# Patient Record
Sex: FEMALE | Race: WHITE | Employment: FULL TIME | URBAN - METROPOLITAN AREA
[De-identification: names, ages, dates, MRNs, and addresses within clinical notes are randomized per-mention and may not be internally consistent; named-entity substitution may affect disease eponyms.]

---

## 2017-03-09 ENCOUNTER — TRANSFERRED RECORDS (OUTPATIENT)
Dept: HEALTH INFORMATION MANAGEMENT | Facility: CLINIC | Age: 62
End: 2017-03-09

## 2017-03-16 ENCOUNTER — HOSPITAL ENCOUNTER (OUTPATIENT)
Dept: MRI IMAGING | Facility: CLINIC | Age: 62
End: 2017-03-16
Attending: OPHTHALMOLOGY
Payer: COMMERCIAL

## 2017-03-16 ENCOUNTER — HOSPITAL ENCOUNTER (OUTPATIENT)
Dept: MRI IMAGING | Facility: CLINIC | Age: 62
Discharge: HOME | End: 2017-03-16
Attending: OPHTHALMOLOGY | Admitting: OPHTHALMOLOGY
Payer: COMMERCIAL

## 2017-03-16 DIAGNOSIS — H46.9 OPTIC NEUROPATHY, LEFT: ICD-10-CM

## 2017-03-16 PROCEDURE — 70553 MRI BRAIN STEM W/O & W/DYE: CPT

## 2017-03-16 PROCEDURE — 25500064 ZZH RX 255 OP 636: Performed by: OPHTHALMOLOGY

## 2017-03-16 PROCEDURE — A9585 GADOBUTROL INJECTION: HCPCS | Performed by: OPHTHALMOLOGY

## 2017-03-16 PROCEDURE — 70543 MRI ORBT/FAC/NCK W/O &W/DYE: CPT

## 2017-03-16 RX ORDER — GADOBUTROL 604.72 MG/ML
8 INJECTION INTRAVENOUS ONCE
Status: COMPLETED | OUTPATIENT
Start: 2017-03-16 | End: 2017-03-16

## 2017-03-16 RX ADMIN — GADOBUTROL 8 ML: 604.72 INJECTION INTRAVENOUS at 19:58

## 2017-03-21 ENCOUNTER — DOCUMENTATION ONLY (OUTPATIENT)
Dept: FAMILY MEDICINE | Facility: CLINIC | Age: 62
End: 2017-03-21

## 2017-03-21 ENCOUNTER — TELEPHONE (OUTPATIENT)
Dept: FAMILY MEDICINE | Facility: CLINIC | Age: 62
End: 2017-03-21

## 2017-03-21 DIAGNOSIS — H53.9 VISION CHANGES: ICD-10-CM

## 2017-03-21 DIAGNOSIS — G93.89 BRAIN MASS: Primary | ICD-10-CM

## 2017-03-21 NOTE — PROGRESS NOTES
Called [facility].  Earliest appointment they could work patient in is Monday 3/27/2017 at 9 AM. At [place] office on [address].  Non-detailed message left to return our call to notify patient of referral placed as well as appointment.    Elizabeth Dodson RN   Triage [employer]

## 2017-03-21 NOTE — TELEPHONE ENCOUNTER
Dr. Coffman from [facility] calling for Ai Cottrell CNP. Call given to Dr. Franco as PCP is out of clinic today.   Omayra Harmon RN   [facility] - Triage

## 2017-03-21 NOTE — PROGRESS NOTES
Referral is placed.She has appointment with PCP on 3/22 but if we can coordinate with her and have her get in in next few days with neurosurgery it would be great        Your provider has referred you to: [facility] [phone]   [email or web address]    Please be aware that coverage of these services is subject to the terms and limitations of your health insurance plan.  Call member services at your health plan with any benefit or coverage questions.      Please bring the following with you to your appointment:    (1) Any X-Rays, CTs or MRIs which have been performed.  Contact the facility where they were done to arrange for  prior to your scheduled appointment.   (2) List of current medications  (3) This referral request   (4) Any documents/labs given to you for this referral

## 2017-03-21 NOTE — PROGRESS NOTES
Patient returned call and notified of appointment and agreed with plan.  Elizabeth Dodson RN  Triage [employer]

## 2017-03-21 NOTE — TELEPHONE ENCOUNTER
Spoke with the eye doctor, patient had MRI done for vision issues, and it was noted  she has mass in the brain,she need to be seen by neurosurgeon . Patient was called but not able to reach, will have RN call and have her follow up 3/22 with PCP and discuss further steps which include neurosurgery evaluation.

## 2017-03-21 NOTE — TELEPHONE ENCOUNTER
Dr. Franco spoke with patient and scheduled for tomorrow.  Elizabeth Dodson RN  Triage [employer]                     Next 5 appointments (look out 90 days)     Mar 22, 2017  1:15 PM CDT   Office Visit with SATURNINO Eubanks CNP   [facility] ([facility]    [address] [place] [address]   [phone]            Mar 23, 2017  8:30 AM CDT   MyChart Long with SATURNINO Eubanks CNP   [facility] ([facility])    [address] [place] [id number]   [phone]

## 2017-03-22 ENCOUNTER — OFFICE VISIT (OUTPATIENT)
Dept: FAMILY MEDICINE | Facility: CLINIC | Age: 62
End: 2017-03-22
Payer: COMMERCIAL

## 2017-03-22 VITALS
DIASTOLIC BLOOD PRESSURE: 83 MMHG | HEIGHT: 65 IN | OXYGEN SATURATION: 100 % | SYSTOLIC BLOOD PRESSURE: 132 MMHG | BODY MASS INDEX: 33.66 KG/M2 | TEMPERATURE: 97.7 F | RESPIRATION RATE: 14 BRPM | WEIGHT: 202 LBS

## 2017-03-22 DIAGNOSIS — E78.5 HYPERLIPIDEMIA LDL GOAL <130: ICD-10-CM

## 2017-03-22 DIAGNOSIS — D49.6 BRAIN TUMOR (H): ICD-10-CM

## 2017-03-22 DIAGNOSIS — E66.811 OBESITY (BMI 30.0-34.9): ICD-10-CM

## 2017-03-22 DIAGNOSIS — I10 HYPERTENSION GOAL BP (BLOOD PRESSURE) < 140/90: Primary | ICD-10-CM

## 2017-03-22 DIAGNOSIS — Z11.59 NEED FOR HEPATITIS C SCREENING TEST: ICD-10-CM

## 2017-03-22 DIAGNOSIS — Z12.31 VISIT FOR SCREENING MAMMOGRAM: ICD-10-CM

## 2017-03-22 PROCEDURE — 99213 OFFICE O/P EST LOW 20 MIN: CPT | Performed by: NURSE PRACTITIONER

## 2017-03-22 RX ORDER — SIMVASTATIN 10 MG
TABLET ORAL
Qty: 90 TABLET | Refills: 1 | Status: SHIPPED | OUTPATIENT
Start: 2017-03-22 | End: 2017-09-28

## 2017-03-22 RX ORDER — LISINOPRIL AND HYDROCHLOROTHIAZIDE 12.5; 2 MG/1; MG/1
1 TABLET ORAL DAILY
Qty: 90 TABLET | Refills: 1 | Status: SHIPPED | OUTPATIENT
Start: 2017-03-22 | End: 2017-09-28

## 2017-03-22 NOTE — MR AVS SNAPSHOT
After Visit Summary   3/22/2017    Juany Krueger    MRN: [id number]           Patient Information     Date Of Birth          1955        Visit Information        Provider Department      3/22/2017 1:15 PM Ai Cottrell APRN CNP [facility]        Today's Diagnoses     Hypertension goal BP (blood pressure) < 140/90    -  1    Hyperlipidemia LDL goal <130        Brain tumor (H)        Obesity (BMI 30.0-34.9)        Visit for screening mammogram        Need for hepatitis C screening test           Follow-ups after your visit        Your next 10 appointments already scheduled     Mar 23, 2017  8:45 AM CDT   LAB with [facility]   [facility] ([facility]    [address] [facility] [address] [phone]           OUTSIDE LABS:  Please include name of facility and Physician that is requesting outside labs be drawn.  Please indicate if labs are fasting or non-fasting on appt notes.  Be as specific as you can on which labs are being drawn.            Mar 27, 2017  9:00 AM CDT   New Visit with Holden Dunn MD   [facility] ([facility])    [address] [place] [id number] [phone]              Future tests that were ordered for you today     Open Future Orders        Priority Expected Expires Ordered    MA SCREENING DIGITAL BILAT - Future  (s+30) Routine 3/23/2017 4/28/2017 3/22/2017    Hepatitis C Screen Reflex to HCV RNA Quant and Genotype Routine 3/23/2017 4/28/2017 3/22/2017    Comprehensive metabolic panel Routine 3/23/2017 4/28/2017 3/22/2017    Lipid Profile with reflex to direct LDL Routine 3/23/2017 4/28/2017 3/22/2017            Who to contact     If you have questions or need follow up information about today's clinic visit or your schedule please contact [facility] directly at [phone].  Normal or non-critical lab and  "imaging results will be communicated to you by MyChart, letter or phone within 4 business days after the clinic has received the results. If you do not hear from us within 7 days, please contact the clinic through [employer] or phone. If you have a critical or abnormal lab result, we will notify you by phone as soon as possible.  Submit refill requests through [employer] or call your pharmacy and they will forward the refill request to us. Please allow 3 business days for your refill to be completed.          Additional Information About Your Visit        [employer] Information     [employer] gives you secure access to your electronic health record. If you see a primary care provider, you can also send messages to your care team and make appointments. If you have questions, please call your primary care clinic.  If you do not have a primary care provider, please call [phone] and they will assist you.        Care EveryWhere ID     This is your Care EveryWhere ID. This could be used by other organizations to access your [place] medical records  [id number]        Your Vitals Were     Temperature Respirations Height Last Period Pulse Oximetry BMI (Body Mass Index)    97.7  F (36.5  C) (Tympanic) 14 5' 5\" (1.651 m) 09/17/2006 100% 33.61 kg/m2       Blood Pressure from Last 3 Encounters:   03/22/17 132/83   03/28/16 115/77   05/20/15 139/81    Weight from Last 3 Encounters:   03/22/17 202 lb (91.6 kg)   03/28/16 194 lb (88 kg)   05/20/15 207 lb (93.9 kg)                 Where to get your medicines      These medications were sent to [id number] - [place] - [address]  [address], [place] [address]     Phone:  [phone]     lisinopril-hydrochlorothiazide 20-12.5 MG per tablet    simvastatin 10 MG tablet          Primary Care Provider Office Phone # Fax #    Ai SATURNINO Cottrell [phone]       [address]  [place] [address]      "   Thank you!     Thank you for choosing [facility] [place]  for your care. Our goal is always to provide you with excellent care. Hearing back from our patients is one way we can continue to improve our services. Please take a few minutes to complete the written survey that you may receive in the mail after your visit with us. Thank you!             Your Updated Medication List - Protect others around you: Learn how to safely use, store and throw away your medicines at www.disposemymeds.org.          This list is accurate as of: 3/22/17  1:56 PM.  Always use your most recent med list.                   Brand Name Dispense Instructions for use    aspirin 81 MG tablet     30 tablet    Take 1 tablet (81 mg) by mouth daily       lisinopril-hydrochlorothiazide 20-12.5 MG per tablet    PRINZIDE/ZESTORETIC    90 tablet    Take 1 tablet by mouth daily       simvastatin 10 MG tablet    ZOCOR    90 tablet    Take 1 tablet by mouth. at bedtime.

## 2017-03-22 NOTE — PROGRESS NOTES
SUBJECTIVE:                                                    Juany Krueger is a 61 year old female who presents to clinic today for the following health issues:      Results         Description (location/character/radiation): Pt is here to go over her MRI results.             Problem list and histories reviewed & adjusted, as indicated.    Additional history:     Having a fuzziness to her left eye vision. Wears glasses. [facility].   An MRI showed:   Large irregularly shaped extra-axial mass involving the left side  of the suprasellar cistern, left middle cranial fossa and left  anterior cranial fossa consistent with a large parasellar meningioma.  This mass displaces the left side of the optic chiasm and likely  alters the course of the left optic nerve. The mass abuts and likely  mildly displaces the left carotid terminus.      Has been under a lot of stress.   quickly of acute myeloid leukemia at age 60.  He was self employed. She is talking with a  about life insurance. Has to sell her house.  Daughter got  in 2017.     Works as a  at [employer]. A pediatrician at her clinic reviewed the MRI report with her and advised she see neurosurgeon Dr Puri. Hopes to have appt early next week. He is on vacation this week.  No other symptoms except the vision change.     Due to have fasting blood work for hypertension and hyperlipidemia.  Needs meds refilled.     Patient Active Problem List   Diagnosis     HYPERLIPIDEMIA LDL GOAL <130     Hypertension goal BP (blood pressure) < 140/90     Obesity (BMI 30.0-34.9)     Past Surgical History:   Procedure Laterality Date     ECHO COMPLETE      normal, has flow murmur     HC REDUCTION OF LARGE BREAST       PELVIS LAPAROSCOPY,DX         Social History   Substance Use Topics     Smoking status: Former Smoker     Packs/day: 0.25     Years: 3.00     Types: Cigarettes     Quit date: 1975     Smokeless  "tobacco: Never Used     Alcohol use 0.6 - 1.2 oz/week     1 - 2 Standard drinks or equivalent per week      Comment: socially     Family History   Problem Relation Age of Onset     Hypertension Mother      CANCER Mother      brain CA     Hypertension Sister      Hypertension Brother      Lipids Sister          Current Outpatient Prescriptions   Medication Sig Dispense Refill     lisinopril-hydrochlorothiazide (PRINZIDE/ZESTORETIC) 20-12.5 MG per tablet Take 1 tablet by mouth daily 90 tablet 1     simvastatin (ZOCOR) 10 MG tablet Take 1 tablet by mouth. at bedtime. 90 tablet 1     aspirin 81 MG tablet Take 1 tablet (81 mg) by mouth daily 30 tablet 0     [DISCONTINUED] lisinopril-hydrochlorothiazide (PRINZIDE,ZESTORETIC) 20-12.5 MG per tablet Take 1 tablet by mouth daily 90 tablet 1     [DISCONTINUED] simvastatin (ZOCOR) 10 MG tablet Take 1 tablet by mouth. at bedtime. 90 tablet 3       Reviewed and updated as needed this visit by clinical staff       Reviewed and updated as needed this visit by Provider         ROS:  Constitutional, HEENT, cardiovascular, pulmonary, gi and gu systems are negative, except as otherwise noted.    OBJECTIVE:                                                    /83 (Cuff Size: Adult Large)  Temp 97.7  F (36.5  C) (Tympanic)  Resp 14  Ht 5' 5\" (1.651 m)  Wt 202 lb (91.6 kg)  LMP 09/17/2006  SpO2 100%  BMI 33.61 kg/m2  Body mass index is 33.61 kg/(m^2).  GENERAL APPEARANCE: healthy, alert, teary eyed discussing loss of her   RESP: lungs clear to auscultation - no rales, rhonchi or wheezes  CV: regular rates and rhythm, normal S1 S2, no S3 or S4 and no murmur, click or rub  ABDOMEN: soft, nontender, no HSM          ASSESSMENT/PLAN:                                                        ICD-10-CM    1. Hypertension goal BP (blood pressure) < 140/90 I10 Comprehensive metabolic panel     lisinopril-hydrochlorothiazide (PRINZIDE/ZESTORETIC) 20-12.5 MG per tablet   2. " Hyperlipidemia LDL goal <130 E78.5 Lipid Profile with reflex to direct LDL     simvastatin (ZOCOR) 10 MG tablet   3. Brain tumor (H) D49.6    4. Obesity (BMI 30.0-34.9) E66.9    5. Visit for screening mammogram Z12.31 MA SCREENING DIGITAL BILAT - Future  (s+30)   6. Need for hepatitis C screening test Z11.59 Hepatitis C Screen Reflex to HCV RNA Quant and Genotype       Discussed condition and symptomatic cares  Advise to call this clinic if can't get a timely appt with Dr Puri.  She has appt with Dr Dunn 3/27/2017 but plans to cancel   Reviewed her health  Follow up as needed      SATURNINO Eubanks [facility] [place] [facility]

## 2017-03-22 NOTE — NURSING NOTE
"Chief Complaint   Patient presents with     Results     MRI       Initial /83 (Cuff Size: Adult Large)  Temp 97.7  F (36.5  C) (Tympanic)  Resp 14  Ht 5' 5\" (1.651 m)  Wt 202 lb (91.6 kg)  LMP 09/17/2006  SpO2 100%  BMI 33.61 kg/m2 Estimated body mass index is 33.61 kg/(m^2) as calculated from the following:    Height as of this encounter: 5' 5\" (1.651 m).    Weight as of this encounter: 202 lb (91.6 kg).  Medication Reconciliation: complete   Tabatha Perdomo, CMA    "

## 2017-03-23 DIAGNOSIS — I10 HYPERTENSION GOAL BP (BLOOD PRESSURE) < 140/90: ICD-10-CM

## 2017-03-23 DIAGNOSIS — Z11.59 NEED FOR HEPATITIS C SCREENING TEST: ICD-10-CM

## 2017-03-23 DIAGNOSIS — E78.5 HYPERLIPIDEMIA LDL GOAL <130: ICD-10-CM

## 2017-03-23 LAB
ALBUMIN SERPL-MCNC: 3.9 G/DL (ref 3.4–5)
ALP SERPL-CCNC: 79 U/L (ref 40–150)
ALT SERPL W P-5'-P-CCNC: 31 U/L (ref 0–50)
ANION GAP SERPL CALCULATED.3IONS-SCNC: 5 MMOL/L (ref 3–14)
AST SERPL W P-5'-P-CCNC: 13 U/L (ref 0–45)
BILIRUB SERPL-MCNC: 0.5 MG/DL (ref 0.2–1.3)
BUN SERPL-MCNC: 15 MG/DL (ref 7–30)
CALCIUM SERPL-MCNC: 9.5 MG/DL (ref 8.5–10.1)
CHLORIDE SERPL-SCNC: 108 MMOL/L (ref 94–109)
CHOLEST SERPL-MCNC: 199 MG/DL
CO2 SERPL-SCNC: 31 MMOL/L (ref 20–32)
CREAT SERPL-MCNC: 0.86 MG/DL (ref 0.52–1.04)
GFR SERPL CREATININE-BSD FRML MDRD: 68 ML/MIN/1.7M2
GLUCOSE SERPL-MCNC: 110 MG/DL (ref 70–99)
HCV AB SERPL QL IA: NORMAL
HDLC SERPL-MCNC: 61 MG/DL
LDLC SERPL CALC-MCNC: 120 MG/DL
NONHDLC SERPL-MCNC: 138 MG/DL
POTASSIUM SERPL-SCNC: 4 MMOL/L (ref 3.4–5.3)
PROT SERPL-MCNC: 7.4 G/DL (ref 6.8–8.8)
SODIUM SERPL-SCNC: 144 MMOL/L (ref 133–144)
TRIGL SERPL-MCNC: 90 MG/DL

## 2017-03-23 PROCEDURE — 80061 LIPID PANEL: CPT | Performed by: NURSE PRACTITIONER

## 2017-03-23 PROCEDURE — 80053 COMPREHEN METABOLIC PANEL: CPT | Performed by: NURSE PRACTITIONER

## 2017-03-23 PROCEDURE — 36415 COLL VENOUS BLD VENIPUNCTURE: CPT | Performed by: NURSE PRACTITIONER

## 2017-03-23 PROCEDURE — 86803 HEPATITIS C AB TEST: CPT | Performed by: NURSE PRACTITIONER

## 2017-05-04 ENCOUNTER — OFFICE VISIT (OUTPATIENT)
Dept: FAMILY MEDICINE | Facility: CLINIC | Age: 62
End: 2017-05-04
Payer: COMMERCIAL

## 2017-05-04 VITALS
HEIGHT: 66 IN | OXYGEN SATURATION: 97 % | SYSTOLIC BLOOD PRESSURE: 119 MMHG | DIASTOLIC BLOOD PRESSURE: 82 MMHG | WEIGHT: 201 LBS | TEMPERATURE: 98 F | HEART RATE: 85 BPM | BODY MASS INDEX: 32.3 KG/M2

## 2017-05-04 DIAGNOSIS — E78.5 HYPERLIPIDEMIA LDL GOAL <130: ICD-10-CM

## 2017-05-04 DIAGNOSIS — E66.811 OBESITY (BMI 30.0-34.9): ICD-10-CM

## 2017-05-04 DIAGNOSIS — R73.09 ELEVATED GLUCOSE: ICD-10-CM

## 2017-05-04 DIAGNOSIS — I10 HYPERTENSION GOAL BP (BLOOD PRESSURE) < 140/90: ICD-10-CM

## 2017-05-04 DIAGNOSIS — D49.6 BRAIN TUMOR (H): ICD-10-CM

## 2017-05-04 DIAGNOSIS — Z01.818 PRE-OP EXAM: Primary | ICD-10-CM

## 2017-05-04 LAB
ANION GAP SERPL CALCULATED.3IONS-SCNC: 7 MMOL/L (ref 3–14)
BUN SERPL-MCNC: 16 MG/DL (ref 7–30)
CALCIUM SERPL-MCNC: 9.6 MG/DL (ref 8.5–10.1)
CHLORIDE SERPL-SCNC: 105 MMOL/L (ref 94–109)
CO2 SERPL-SCNC: 29 MMOL/L (ref 20–32)
CREAT SERPL-MCNC: 0.87 MG/DL (ref 0.52–1.04)
GFR SERPL CREATININE-BSD FRML MDRD: 66 ML/MIN/1.7M2
GLUCOSE SERPL-MCNC: 108 MG/DL (ref 70–99)
HBA1C MFR BLD: 5.7 % (ref 4.3–6)
HGB BLD-MCNC: 14.5 G/DL (ref 11.7–15.7)
POTASSIUM SERPL-SCNC: 4 MMOL/L (ref 3.4–5.3)
SODIUM SERPL-SCNC: 141 MMOL/L (ref 133–144)

## 2017-05-04 PROCEDURE — 80048 BASIC METABOLIC PNL TOTAL CA: CPT | Performed by: NURSE PRACTITIONER

## 2017-05-04 PROCEDURE — 99214 OFFICE O/P EST MOD 30 MIN: CPT | Mod: 25 | Performed by: NURSE PRACTITIONER

## 2017-05-04 PROCEDURE — 36415 COLL VENOUS BLD VENIPUNCTURE: CPT | Performed by: NURSE PRACTITIONER

## 2017-05-04 PROCEDURE — 83036 HEMOGLOBIN GLYCOSYLATED A1C: CPT | Performed by: NURSE PRACTITIONER

## 2017-05-04 PROCEDURE — 93000 ELECTROCARDIOGRAM COMPLETE: CPT | Performed by: NURSE PRACTITIONER

## 2017-05-04 PROCEDURE — 85018 HEMOGLOBIN: CPT | Performed by: NURSE PRACTITIONER

## 2017-05-04 NOTE — PROGRESS NOTES
[facility]  [address] [address]  [phone]  Dept: [phone]    PRE-OP EVALUATION:  Today's date: 2017    Juany Krueger (: 1955) presents for pre-operative evaluation assessment as requested by Dr. Karen Puri.  She requires evaluation and anesthesia risk assessment prior to undergoing surgery/procedure for treatment of  Proposed procedure: craniotomy brain surgery     Date of Surgery/ Procedure: 5/10/2017  Time of Surgery/ Procedure: 7:45 am   Hospital/Surgical Facility: [facility]  Fax number for surgical facility: [phone]   Primary Physician: Ai Cottrell NP/Twin Kaminski MD  Type of Anesthesia Anticipated: General    Patient has a Health Care Directive or Living Will:  NO    1. NO - Do you have a history of heart attack, stroke, stent, bypass or surgery on an artery in the head, neck, heart or legs?  2. NO - Do you ever have any pain or discomfort in your chest?  3. NO - Do you have a history of  Heart Failure?  4. NO - Are you troubled by shortness of breath when: walking on the level, up a slight hill or at night?  5. NO - Do you currently have a cold, bronchitis or other respiratory infection?  6. NO - Do you have a cough, shortness of breath or wheezing?  7. NO - Do you sometimes get pains in the calves of your legs when you walk?  8. NO - Do you or anyone in your family have previous history of blood clots?  9. NO - Do you or does anyone in your family have a serious bleeding problem such as prolonged bleeding following surgeries or cuts?  10. NO - Have you ever had problems with anemia or been told to take iron pills?  11. NO - Have you had any abnormal blood loss such as black, tarry or bloody stools, or abnormal vaginal bleeding?  12. NO - Have you ever had a blood transfusion?  13. YES - HAVE YOU OR ANY OF YOUR RELATIVES EVER HAD PROBLEMS WITH ANESTHESIA? Mother- when had brain cancer surgery  14. YES - DO YOU  HAVE SLEEP APNEA, EXCESSIVE SNORING OR DAYTIME DROWSINESS?  Snoring   15. NO - Do you have any prosthetic heart valves?  16. NO - Do you have prosthetic joints?  17. NO - Is there any chance that you may be pregnant?      HPI:                                                      Brief HPI related to upcoming procedure:  Blurry vision in left eye. MRI revealed a brain tumor      See problem list for active medical problems.  Problems all longstanding and stable, except as noted/documented.  See ROS for pertinent symptoms related to these conditions.                                                                                                  .    MEDICAL HISTORY:                                                      Patient Active Problem List    Diagnosis Date Noted     Obesity (BMI 30.0-34.9) 03/28/2016     Priority: Medium     Hypertension goal BP (blood pressure) < 140/90 11/10/2011     HYPERLIPIDEMIA LDL GOAL <130 10/31/2010      Past Medical History:   Diagnosis Date     Essential hypertension      Flow murmur      Hyperlipidemia      Past Surgical History:   Procedure Laterality Date     ECHO COMPLETE  2005    normal, has flow murmur     HC REDUCTION OF LARGE BREAST       PELVIS LAPAROSCOPY,DX       Current Outpatient Prescriptions   Medication Sig Dispense Refill     lisinopril-hydrochlorothiazide (PRINZIDE/ZESTORETIC) 20-12.5 MG per tablet Take 1 tablet by mouth daily 90 tablet 1     simvastatin (ZOCOR) 10 MG tablet Take 1 tablet by mouth. at bedtime. 90 tablet 1     OTC products: no recent use of OTC ASA, NSAIDS or Steroids    No Known Allergies   Latex Allergy: NO    Social History   Substance Use Topics     Smoking status: Former Smoker     Packs/day: 0.25     Years: 3.00     Types: Cigarettes     Quit date: 5/16/1975     Smokeless tobacco: Never Used     Alcohol use 0.6 - 1.2 oz/week     1 - 2 Standard drinks or equivalent per week      Comment: socially     History   Drug Use No       REVIEW OF  "SYSTEMS:                                                    C: NEGATIVE for fever, chills, change in weight  I: NEGATIVE for worrisome rashes, moles or lesions  E: POSITIVE for left vision changes as noted. Wears glasses  E/M: NEGATIVE for ear, mouth and throat problems  R: NEGATIVE for significant cough or SOB  CV: NEGATIVE for chest pain, palpitations or peripheral edema  GI: NEGATIVE for nausea, abdominal pain, heartburn, or change in bowel habits  : NEGATIVE for frequency, dysuria, or hematuria  M: NEGATIVE for significant arthralgias or myalgia  N: NEGATIVE for weakness, dizziness or paresthesias. No headaches.   E: NEGATIVE for temperature intolerance, skin/hair changes  H: NEGATIVE for bleeding problems  P: NEGATIVE for changes in mood or affect    EXAM:                                                    /82 (BP Location: Left arm, Cuff Size: Adult Large)  Pulse 85  Temp 98  F (36.7  C) (Oral)  Ht 5' 5.5\" (1.664 m)  Wt 201 lb (91.2 kg)  LMP 09/17/2006  SpO2 97%  BMI 32.94 kg/m2    GENERAL APPEARANCE: healthy, alert and no distress     EYES: EOMI, PERRL     HENT: ear canals and TM's normal and nose and mouth without ulcers or lesions     NECK: no adenopathy, no asymmetry, masses, or scars and thyroid normal to palpation     RESP: lungs clear to auscultation - no rales, rhonchi or wheezes     CV: regular rates and rhythm, normal S1/S2, no S3 or S4. Grade 2/6 systolic murmur (stable).      ABDOMEN:  soft, nontender, no HSM or masses and bowel sounds normal     MS: extremities normal- no gross deformities noted, no evidence of inflammation in joints, FROM in all extremities.     SKIN: no suspicious lesions or rashes     NEURO: Normal strength and tone, sensory exam grossly normal, mentation intact and speech normal     PSYCH: mentation appears normal. and affect normal/bright     LYMPHATICS: No cervical, or supraclavicular nodes    DIAGNOSTICS:                                                  "     Results for orders placed or performed in visit on 05/04/17   Hemoglobin   Result Value Ref Range    Hemoglobin 14.5 11.7 - 15.7 g/dL   Basic metabolic panel  (Ca, Cl, CO2, Creat, Gluc, K, Na, BUN)   Result Value Ref Range    Sodium 141 133 - 144 mmol/L    Potassium 4.0 3.4 - 5.3 mmol/L    Chloride 105 94 - 109 mmol/L    Carbon Dioxide 29 20 - 32 mmol/L    Anion Gap 7 3 - 14 mmol/L    Glucose 108 (H) 70 - 99 mg/dL    Urea Nitrogen 16 7 - 30 mg/dL    Creatinine 0.87 0.52 - 1.04 mg/dL    GFR Estimate 66 >60 mL/min/1.7m2    GFR Estimate If Black 80 >60 mL/min/1.7m2    Calcium 9.6 8.5 - 10.1 mg/dL   Hemoglobin A1c   Result Value Ref Range    Hemoglobin A1C 5.7 4.3 - 6.0 %     EKG: normal sinus rhythm  ECHO: 2005 done for new heart murmur evaluation- normal     Recent Labs   Lab Test  03/23/17   0908  03/29/16   0813   04/30/14   1042   HGB   --    --    --   14.0   PLT   --    --    --   303   NA  144  144   < >  145*   POTASSIUM  4.0  4.4   < >  4.4   CR  0.86  0.80   < >  0.90   A1C   --    --    --   5.6    < > = values in this interval not displayed.        IMPRESSION:                                                    Reason for surgery/procedure:  Brain tumor    Diagnosis/reason for consult:   Hypertension  Elevated blood glucose  Hyperlipidemia        The proposed surgical procedure is considered HIGH risk.    REVISED CARDIAC RISK INDEX  The patient has the following serious cardiovascular risks for perioperative complications such as (MI, PE, VFib and 3  AV Block):  No serious cardiac risks  INTERPRETATION: 2 risks: Class III (moderate risk - 6.6% complication rate)  Hypertension  Hyperlipidemia    The patient has the following additional risks for perioperative complications:   Obesity BMI 32.94         RECOMMENDATIONS:                                                      --Consult hospital rounder / IM to assist post-op medical management        APPROVAL GIVEN to proceed with proposed procedure, without  further diagnostic evaluation       Signed Electronically by: SATURNINO Eubanks CNP    I have reviewed the documented encounter in it's entireity and concur with Ms. Cottrell's assessment and plan.   Twin Kaminski MD      Copy of this evaluation report is provided to requesting physician.    [facility] Preop Guidelines

## 2017-05-04 NOTE — MR AVS SNAPSHOT
After Visit Summary   5/4/2017    Juany Krueger    MRN: [id number]           Patient Information     Date Of Birth          1955        Visit Information        Provider Department      5/4/2017 9:00 AM Ai Cottrell APRN [facility]        Today's Diagnoses     Pre-op exam    -  1    Hypertension goal BP (blood pressure) < 140/90        Obesity (BMI 30.0-34.9)        Hyperlipidemia LDL goal <130        Elevated glucose          Care Instructions      Before Your Surgery      Call your surgeon if there is any change in your health. This includes signs of a cold or flu (such as a sore throat, runny nose, cough, rash or fever).    Do not smoke, drink alcohol or take over the counter medicine (unless your surgeon or primary care doctor tells you to) for the 24 hours before and after surgery.    If you take prescribed drugs: Follow your doctor s orders about which medicines to take and which to stop until after surgery.    Eating and drinking prior to surgery: follow the instructions from your surgeon    Take a shower or bath the night before surgery. Use the soap your surgeon gave you to gently clean your skin. If you do not have soap from your surgeon, use your regular soap. Do not shave or scrub the surgery site.  Wear clean pajamas and have clean sheets on your bed.         Follow-ups after your visit        Who to contact     If you have questions or need follow up information about today's clinic visit or your schedule please contact [facility] [place] directly at [phone].  Normal or non-critical lab and imaging results will be communicated to you by MyChart, letter or phone within 4 business days after the clinic has received the results. If you do not hear from us within 7 days, please contact the clinic through MyChart or phone. If you have a critical or abnormal lab result, we will notify you by phone as soon as possible.  Submit refill requests  "through [employer] or call your pharmacy and they will forward the refill request to us. Please allow 3 business days for your refill to be completed.          Additional Information About Your Visit        Noribachihart Information     [employer] gives you secure access to your electronic health record. If you see a primary care provider, you can also send messages to your care team and make appointments. If you have questions, please call your primary care clinic.  If you do not have a primary care provider, please call [phone] and they will assist you.        Care EveryWhere ID     This is your Care EveryWhere ID. This could be used by other organizations to access your [place] medical records  [id number]        Your Vitals Were     Pulse Temperature Height Last Period Pulse Oximetry BMI (Body Mass Index)    85 98  F (36.7  C) (Oral) 5' 5.5\" (1.664 m) 09/17/2006 97% 32.94 kg/m2       Blood Pressure from Last 3 Encounters:   05/04/17 119/82   03/22/17 132/83   03/28/16 115/77    Weight from Last 3 Encounters:   05/04/17 201 lb (91.2 kg)   03/22/17 202 lb (91.6 kg)   03/28/16 194 lb (88 kg)              We Performed the Following     Basic metabolic panel  (Ca, Cl, CO2, Creat, Gluc, K, Na, BUN)     EKG 12-lead complete w/read - Clinics     Hemoglobin A1c     Hemoglobin        Primary Care Provider Office Phone # Fax #    SATURNINO Eubanks [phone]       [address]  [place] [address]        Thank you!     Thank you for [facility]  for your care. Our goal is always to provide you with excellent care. Hearing back from our patients is one way we can continue to improve our services. Please take a few minutes to complete the written survey that you may receive in the mail after your visit with us. Thank you!             Your Updated Medication List - Protect others around you: Learn how to safely use, store and throw away your medicines at " www.disposemymeds.org.          This list is accurate as of: 5/4/17  1:29 PM.  Always use your most recent med list.                   Brand Name Dispense Instructions for use    aspirin 81 MG tablet     30 tablet    Take 1 tablet (81 mg) by mouth daily       lisinopril-hydrochlorothiazide 20-12.5 MG per tablet    PRINZIDE/ZESTORETIC    90 tablet    Take 1 tablet by mouth daily       simvastatin 10 MG tablet    ZOCOR    90 tablet    Take 1 tablet by mouth. at bedtime.

## 2017-05-10 ENCOUNTER — TRANSFERRED RECORDS (OUTPATIENT)
Dept: HEALTH INFORMATION MANAGEMENT | Facility: CLINIC | Age: 62
End: 2017-05-10

## 2017-05-16 ENCOUNTER — TELEPHONE (OUTPATIENT)
Dept: FAMILY MEDICINE | Facility: CLINIC | Age: 62
End: 2017-05-16

## 2017-05-16 NOTE — TELEPHONE ENCOUNTER
5/16/2017    Call Regarding Preventive Health Screening Mammogram    Attempt 1    Message No voicemail left - VIP      Comments: VIP Mammogram Patient - schedule in VIP slots only        Outreach   TERRIE

## 2017-05-25 PROBLEM — D42.0: Status: ACTIVE | Noted: 2017-05-25

## 2017-06-07 ENCOUNTER — TRANSFERRED RECORDS (OUTPATIENT)
Dept: HEALTH INFORMATION MANAGEMENT | Facility: CLINIC | Age: 62
End: 2017-06-07

## 2017-06-15 ENCOUNTER — TRANSFERRED RECORDS (OUTPATIENT)
Dept: HEALTH INFORMATION MANAGEMENT | Facility: CLINIC | Age: 62
End: 2017-06-15

## 2017-09-28 ENCOUNTER — OFFICE VISIT (OUTPATIENT)
Dept: FAMILY MEDICINE | Facility: CLINIC | Age: 62
End: 2017-09-28
Payer: COMMERCIAL

## 2017-09-28 VITALS
TEMPERATURE: 98.2 F | OXYGEN SATURATION: 98 % | DIASTOLIC BLOOD PRESSURE: 74 MMHG | RESPIRATION RATE: 16 BRPM | HEART RATE: 80 BPM | BODY MASS INDEX: 30.86 KG/M2 | HEIGHT: 66 IN | WEIGHT: 192 LBS | SYSTOLIC BLOOD PRESSURE: 114 MMHG

## 2017-09-28 DIAGNOSIS — E78.5 HYPERLIPIDEMIA LDL GOAL <130: ICD-10-CM

## 2017-09-28 DIAGNOSIS — I10 HYPERTENSION GOAL BP (BLOOD PRESSURE) < 140/90: ICD-10-CM

## 2017-09-28 DIAGNOSIS — E66.811 OBESITY (BMI 30.0-34.9): ICD-10-CM

## 2017-09-28 DIAGNOSIS — D42.0 ATYPICAL INTRACRANIAL MENINGIOMA (H): Primary | ICD-10-CM

## 2017-09-28 PROCEDURE — 99213 OFFICE O/P EST LOW 20 MIN: CPT | Performed by: PHYSICIAN ASSISTANT

## 2017-09-28 RX ORDER — SIMVASTATIN 10 MG
TABLET ORAL
Qty: 90 TABLET | Refills: 2 | Status: SHIPPED | OUTPATIENT
Start: 2017-09-28 | End: 2018-06-29

## 2017-09-28 RX ORDER — LISINOPRIL AND HYDROCHLOROTHIAZIDE 12.5; 2 MG/1; MG/1
1 TABLET ORAL DAILY
Qty: 90 TABLET | Refills: 2 | Status: SHIPPED | OUTPATIENT
Start: 2017-09-28 | End: 2018-06-29

## 2017-09-28 NOTE — PROGRESS NOTES
"Chief Complaint   Patient presents with     Hypertension     Recheck Medication       Initial /74  Pulse 80  Temp 98.2  F (36.8  C)  Resp 16  Ht 5' 5.5\" (1.664 m)  Wt 192 lb (87.1 kg)  LMP 09/17/2006  SpO2 98%  BMI 31.46 kg/m2 Estimated body mass index is 31.46 kg/(m^2) as calculated from the following:    Height as of this encounter: 5' 5.5\" (1.664 m).    Weight as of this encounter: 192 lb (87.1 kg).  Medication Reconciliation: complete. MINI Holbrook LPN        SUBJECTIVE:   Juany Krueger is a 61 year old female who presents to clinic today for the following health issues:      Hypertension Follow-up      Outpatient blood pressures are checked at other appointments.  Results are 118/70.    Low Salt Diet: low salt        Amount of exercise or physical activity: 2 days/week for an average of 15-30 minutes    Problems taking medications regularly: No    Medication side effects: none  Diet: low salt      MINI Holbrook LPN      -------------------------------------    Problem list and histories reviewed & adjusted, as indicated.  Additional history: as documented    Patient Active Problem List   Diagnosis     HYPERLIPIDEMIA LDL GOAL <130     Hypertension goal BP (blood pressure) < 140/90     Obesity (BMI 30.0-34.9)     Atypical intracranial meningioma (H)     Past Surgical History:   Procedure Laterality Date     CRANIOTOMY  05/10/2017    atypical menigioma- sphenoid wing, left sided      ECHO COMPLETE  2005    normal, has flow murmur     HC REDUCTION OF LARGE BREAST       PELVIS LAPAROSCOPY,DX         Social History   Substance Use Topics     Smoking status: Former Smoker     Packs/day: 0.25     Years: 3.00     Types: Cigarettes     Quit date: 5/16/1975     Smokeless tobacco: Never Used     Alcohol use 0.6 - 1.2 oz/week     1 - 2 Standard drinks or equivalent per week      Comment: socially     Family History   Problem Relation Age of Onset     Hypertension Mother      CANCER Mother      brain CA     " Hypertension Sister      Hypertension Brother      Lipids Sister          Current Outpatient Prescriptions   Medication Sig Dispense Refill     lisinopril-hydrochlorothiazide (PRINZIDE/ZESTORETIC) 20-12.5 MG per tablet Take 1 tablet by mouth daily 90 tablet 1     simvastatin (ZOCOR) 10 MG tablet Take 1 tablet by mouth. at bedtime. 90 tablet 1     No Known Allergies  Recent Labs   Lab Test  05/04/17   0953  03/23/17   0908  03/29/16   0813  05/20/15   0857  04/30/14   1042  01/17/13   1007   A1C  5.7   --    --    --   5.6   --    LDL   --   120*  123*  121  134*  129   HDL   --   61  48*  47*  51  52   TRIG   --   90  133  131  105  112   ALT   --   31  32  45  24  50   CR  0.87  0.86  0.80  0.89  0.90  0.93   GFRESTIMATED  66  68  73  65  65  63   GFRESTBLACK  80  82  89  79  78  76   POTASSIUM  4.0  4.0  4.4  3.9  4.4  4.4   TSH   --    --    --    --   2.36  1.88      BP Readings from Last 3 Encounters:   09/28/17 114/74   05/04/17 119/82   03/22/17 132/83    Wt Readings from Last 3 Encounters:   09/28/17 192 lb (87.1 kg)   05/04/17 201 lb (91.2 kg)   03/22/17 202 lb (91.6 kg)                  Labs reviewed in EPIC          Reviewed and updated as needed this visit by clinical staffTobacco  Allergies  Meds  Fam Hx  Soc Hx      Reviewed and updated as needed this visit by Provider         Social History     Social History     Marital status:      Spouse name: Linden     Number of children: 2     Years of education: N/A     Occupational History           [employer] (peds clinic)      Social History Main Topics     Smoking status: Former Smoker     Packs/day: 0.25     Years: 3.00     Types: Cigarettes     Quit date: 5/16/1975     Smokeless tobacco: Never Used     Alcohol use 0.6 - 1.2 oz/week     1 - 2 Standard drinks or equivalent per week      Comment: socially     Drug use: No     Sexual activity: No     Other Topics Concern      Service No     Blood Transfusions No      "Caffeine Concern No     Occupational Exposure No     Hobby Hazards No     Sleep Concern No     Stress Concern No     Weight Concern Yes     Special Diet No     Back Care No     Exercise Yes     Bike Helmet No     Seat Belt Yes     Self-Exams No     Social History Narrative       10 point review of systems negative other than symptoms noted above.   Constitutional, HEENT, CV, pulmonary, GI, , MS, Endo, Psych systems are all negative, except as otherwise noted.       OBJECTIVE:  /74  Pulse 80  Temp 98.2  F (36.8  C)  Resp 16  Ht 5' 5.5\" (1.664 m)  Wt 192 lb (87.1 kg)  LMP 09/17/2006  SpO2 98%  BMI 31.46 kg/m2  CONSTITUTIONAL: Alert, well-nourished, well-groomed, NAD  RESP: Lungs CTA. No wheeze, rhonchi, rales. Normal effort on room air. Equal lung sounds bilaterally.   CV: HRRR, normal S1, S2. No MRG. No peripheral edema.  DERM: No rashes or suspicious lesions    Diagnostic Tests:  Component      Latest Ref Rng & Units 3/23/2017 5/4/2017   Sodium      133 - 144 mmol/L  141   Potassium      3.4 - 5.3 mmol/L  4.0   Chloride      94 - 109 mmol/L  105   Carbon Dioxide      20 - 32 mmol/L  29   Anion Gap      3 - 14 mmol/L  7   Glucose      70 - 99 mg/dL  108 (H)   Urea Nitrogen      7 - 30 mg/dL  16   Creatinine      0.52 - 1.04 mg/dL  0.87   GFR Estimate      >60 mL/min/1.7m2  66   GFR Estimate If Black      >60 mL/min/1.7m2  80   Calcium      8.5 - 10.1 mg/dL  9.6   Cholesterol      <200 mg/dL 199    Triglycerides      <150 mg/dL 90    HDL Cholesterol      >49 mg/dL 61    LDL Cholesterol Calculated      <100 mg/dL 120 (H)    Non HDL Cholesterol      <130 mg/dL 138 (H)    Hemoglobin      11.7 - 15.7 g/dL  14.5   Hemoglobin A1C      4.3 - 6.0 %  5.7       ASSESSMENT/PLAN:  (I10) Hypertension goal BP (blood pressure) < 140/90  Comment: stable.   Plan: lisinopril-hydrochlorothiazide         (PRINZIDE/ZESTORETIC) 20-12.5 MG per tablet            (E78.5) Hyperlipidemia LDL goal <130  Comment:   Plan: " simvastatin (ZOCOR) 10 MG tablet          (D42.0) Atypical intracranial meningioma (H)  (primary encounter diagnosis)  Comment:   Plan: finished radiation recently - waiting for updated scan.       (E66.9) Obesity (BMI 30.0-34.9)  Comment:   Plan: Weight management plan: Discussed healthy diet and exercise guidelines and patient will follow up in 12 months in clinic to re-evaluate.      Declines mammo and colonoscopy right now.       FOLLOW-UP: recheck 9 months with fasting labs.   The patient agrees with this assessment and plan and agrees to call or return to the clinic with any questions or concerns or if their condition worsens.    TAMEKA Biswas, PA-C  [facility]

## 2017-09-28 NOTE — MR AVS SNAPSHOT
After Visit Summary   9/28/2017    Juany Krueger    MRN: [id number]           Patient Information     Date Of Birth          1955        Visit Information        Provider Department      9/28/2017 9:00 AM Aminta Sow PA-C [facility] [place]        Today's Diagnoses     Atypical intracranial meningioma (H)    -  1    Hypertension goal BP (blood pressure) < 140/90        Hyperlipidemia LDL goal <130        Obesity (BMI 30.0-34.9)           Follow-ups after your visit        Follow-up notes from your care team     Return if symptoms worsen or fail to improve.      Who to contact     If you have questions or need follow up information about today's clinic visit or your schedule please contact [facility] [place] directly at [phone].  Normal or non-critical lab and imaging results will be communicated to you by MyChart, letter or phone within 4 business days after the clinic has received the results. If you do not hear from us within 7 days, please contact the clinic through MyChart or phone. If you have a critical or abnormal lab result, we will notify you by phone as soon as possible.  Submit refill requests through [email or web address] or call your pharmacy and they will forward the refill request to us. Please allow 3 business days for your refill to be completed.          Additional Information About Your Visit        MyChart Information     [email or web address] gives you secure access to your electronic health record. If you see a primary care provider, you can also send messages to your care team and make appointments. If you have questions, please call your primary care clinic.  If you do not have a primary care provider, please call [phone] and they will assist you.        Care EveryWhere ID     This is your Care EveryWhere ID. This could be used by other organizations to access your [place] medical records  [id number]        Your Vitals Were     Pulse Temperature  "Respirations Height Last Period Pulse Oximetry    80 98.2  F (36.8  C) 16 5' 5.5\" (1.664 m) 09/17/2006 98%    BMI (Body Mass Index)                   31.46 kg/m2            Blood Pressure from Last 3 Encounters:   09/28/17 114/74   05/04/17 119/82   03/22/17 132/83    Weight from Last 3 Encounters:   09/28/17 192 lb (87.1 kg)   05/04/17 201 lb (91.2 kg)   03/22/17 202 lb (91.6 kg)              Today, you had the following     No orders found for display         Where to get your medicines      These medications were sent to [id number] - [place] - [address]  [address], [place] [address]     Phone:  [phone]     lisinopril-hydrochlorothiazide 20-12.5 MG per tablet    simvastatin 10 MG tablet          Primary Care Provider Office Phone # Fax #    Aminta Sow PA-C [phone]       [address]  [place] [address]        Equal Access to Services     [facility] AH: Hadii aad ku hadasho Soomaali, waaxda luqadaha, qaybta kaalmada adeegyada, beronica fabianin hayamber martinez. So [facility] [phone].    ATENCIÓN: Si habla español, tiene a singh disposición servicios gratuitos de asistencia lingüística. Llame al [phone].    We comply with applicable federal civil rights laws and [place] laws. We do not discriminate on the basis of race, color, national origin, age, disability sex, sexual orientation or gender identity.            Thank you!     Thank you for choosing [facility] [place]  for your care. Our goal is always to provide you with excellent care. Hearing back from our patients is one way we can continue to improve our services. Please take a few minutes to complete the written survey that you may receive in the mail after your visit with us. Thank you!             Your Updated Medication List - Protect others around you: Learn how to safely use, store and throw away your medicines at " www.disposemymeds.org.          This list is accurate as of: 9/28/17 10:06 AM.  Always use your most recent med list.                   Brand Name Dispense Instructions for use Diagnosis    lisinopril-hydrochlorothiazide 20-12.5 MG per tablet    PRINZIDE/ZESTORETIC    90 tablet    Take 1 tablet by mouth daily    Hypertension goal BP (blood pressure) < 140/90       simvastatin 10 MG tablet    ZOCOR    90 tablet    Take 1 tablet by mouth. at bedtime.    Hyperlipidemia LDL goal <130

## 2018-06-29 ENCOUNTER — OFFICE VISIT (OUTPATIENT)
Dept: FAMILY MEDICINE | Facility: CLINIC | Age: 63
End: 2018-06-29
Payer: COMMERCIAL

## 2018-06-29 VITALS
TEMPERATURE: 97.8 F | BODY MASS INDEX: 27.64 KG/M2 | HEIGHT: 66 IN | HEART RATE: 71 BPM | SYSTOLIC BLOOD PRESSURE: 122 MMHG | WEIGHT: 172 LBS | DIASTOLIC BLOOD PRESSURE: 72 MMHG

## 2018-06-29 DIAGNOSIS — I10 HYPERTENSION GOAL BP (BLOOD PRESSURE) < 140/90: Primary | ICD-10-CM

## 2018-06-29 DIAGNOSIS — D42.0 ATYPICAL INTRACRANIAL MENINGIOMA (H): ICD-10-CM

## 2018-06-29 DIAGNOSIS — E78.5 HYPERLIPIDEMIA LDL GOAL <130: ICD-10-CM

## 2018-06-29 LAB
ANION GAP SERPL CALCULATED.3IONS-SCNC: 10 MMOL/L (ref 3–14)
BUN SERPL-MCNC: 28 MG/DL (ref 7–30)
CALCIUM SERPL-MCNC: 9.5 MG/DL (ref 8.5–10.1)
CHLORIDE SERPL-SCNC: 107 MMOL/L (ref 94–109)
CHOLEST SERPL-MCNC: 182 MG/DL
CO2 SERPL-SCNC: 25 MMOL/L (ref 20–32)
CREAT SERPL-MCNC: 0.78 MG/DL (ref 0.52–1.04)
GFR SERPL CREATININE-BSD FRML MDRD: 76 ML/MIN/1.7M2
GLUCOSE SERPL-MCNC: 97 MG/DL (ref 70–99)
HDLC SERPL-MCNC: 61 MG/DL
LDLC SERPL CALC-MCNC: 103 MG/DL
NONHDLC SERPL-MCNC: 121 MG/DL
POTASSIUM SERPL-SCNC: 3.8 MMOL/L (ref 3.4–5.3)
SODIUM SERPL-SCNC: 142 MMOL/L (ref 133–144)
TRIGL SERPL-MCNC: 89 MG/DL

## 2018-06-29 PROCEDURE — 36415 COLL VENOUS BLD VENIPUNCTURE: CPT | Performed by: INTERNAL MEDICINE

## 2018-06-29 PROCEDURE — 80048 BASIC METABOLIC PNL TOTAL CA: CPT | Performed by: INTERNAL MEDICINE

## 2018-06-29 PROCEDURE — 80061 LIPID PANEL: CPT | Performed by: INTERNAL MEDICINE

## 2018-06-29 PROCEDURE — 99214 OFFICE O/P EST MOD 30 MIN: CPT | Performed by: INTERNAL MEDICINE

## 2018-06-29 RX ORDER — SIMVASTATIN 10 MG
TABLET ORAL
Qty: 90 TABLET | Refills: 3 | Status: SHIPPED | OUTPATIENT
Start: 2018-06-29

## 2018-06-29 RX ORDER — LISINOPRIL AND HYDROCHLOROTHIAZIDE 12.5; 2 MG/1; MG/1
1 TABLET ORAL DAILY
Qty: 90 TABLET | Refills: 3 | Status: SHIPPED | OUTPATIENT
Start: 2018-06-29

## 2018-06-29 NOTE — PROGRESS NOTES
"  SUBJECTIVE:   Juany Krueger is a 62 year old female who presents to clinic today for the following health issues:      Hyperlipidemia Follow-Up      Rate your low fat/cholesterol diet?: not monitoring fat    Taking statin?  Yes, no muscle aches from statin    Other lipid medications/supplements?:  none    Hypertension Follow-up      [facility] .  Results are doesn't remember - thinks normal .    Low Salt Diet: not monitoring salt    Amount of exercise or physical activity: None    Problems taking medications regularly: No    Medication side effects: none    Diet: regular (no restrictions)    Juany is here for med refills.  She is consistent with her medications, has been on for years.  No side effects.  She does not follow a particular diet or get much regular physical activity.  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  She denies headaches, chest pain, palpitations, shortness of breath, leg swelling.    She has had a stressful couple of years.  Her   2 years ago.  She is in the process of selling her house and hoping to downsize to a town home.  She is following with [facility] for a meningioma, had an MRI a few months ago and will have a follow up scan in October.         Reviewed and updated as needed this visit by clinical staff  Tobacco  Allergies  Meds  Problems       Reviewed and updated as needed this visit by Provider  Problems         ROS:  CV, neuro, pulm reviewed,  otherwise negative unless noted above.       OBJECTIVE:     /72 (BP Location: Left arm, Patient Position: Chair, Cuff Size: Adult Regular)  Pulse 71  Temp 97.8  F (36.6  C) (Tympanic)  Ht 5' 5.5\" (1.664 m)  Wt 172 lb (78 kg)  LMP 2006  BMI 28.19 kg/m2  Body mass index is 28.19 kg/(m^2).    Gen: well appearing, pleasant woman, no distress  Pulm: breathing comfortably, CTAB, no wheezes or rales  CV: RRR, normal S1 and S2, " no murmurs  Ext: 2+ distal pulses, no LE edema         ASSESSMENT/PLAN:         1. Hypertension goal BP (blood pressure) < 140/90  Well controlled on lisinopril-hctz.  Refill ordered. Check yearly chemistry panel.    - lisinopril-hydrochlorothiazide (PRINZIDE/ZESTORETIC) 20-12.5 MG per tablet; Take 1 tablet by mouth daily  Dispense: 90 tablet; Refill: 3  - Basic metabolic panel  (Ca, Cl, CO2, Creat, Gluc, K, Na, BUN)    2. Hyperlipidemia LDL goal <130  - simvastatin (ZOCOR) 10 MG tablet; Take 1 tablet by mouth. at bedtime.  Dispense: 90 tablet; Refill: 3  - Lipid panel reflex to direct LDL Fasting    3. Atypical intracranial meningioma (H)  No active treatment, close monitoring. Follows at [facility].     She declines discussion of health maintenance items today, has too much on her plate.     Follow up annually     Raissa Horta MD  [facility]

## 2018-06-29 NOTE — MR AVS SNAPSHOT
"              After Visit Summary   6/29/2018    Juany Krueger    MRN: [id number]           Patient Information     Date Of Birth          1955        Visit Information        Provider Department      6/29/2018 9:40 AM Raissa Horta MD [facility]        Today's Diagnoses     Hypertension goal BP (blood pressure) < 140/90        Hyperlipidemia LDL goal <130           Follow-ups after your visit        Follow-up notes from your care team     Return in about 1 year (around 6/29/2019).      Who to contact     If you have questions or need follow up information about today's clinic visit or your schedule please contact [facility] directly at [phone].  Normal or non-critical lab and imaging results will be communicated to you by ideaTree - innovate | mentor | investhart, letter or phone within 4 business days after the clinic has received the results. If you do not hear from us within 7 days, please contact the clinic through ideaTree - innovate | mentor | investhart or phone. If you have a critical or abnormal lab result, we will notify you by phone as soon as possible.  Submit refill requests through iSSimple or call your pharmacy and they will forward the refill request to us. Please allow 3 business days for your refill to be completed.          Additional Information About Your Visit        MyChart Information     iSSimple gives you secure access to your electronic health record. If you see a primary care provider, you can also send messages to your care team and make appointments. If you have questions, please call your primary care clinic.  If you do not have a primary care provider, please call [phone] and they will assist you.        Care EveryWhere ID     This is your Care EveryWhere ID. This could be used by other organizations to access your [place] medical records  [id number]        Your Vitals Were     Pulse Temperature Height Last Period BMI (Body Mass Index)       71 97.8  F (36.6  C) (Tympanic) 5' 5.5\" " (1.664 m) 09/17/2006 28.19 kg/m2        Blood Pressure from Last 3 Encounters:   06/29/18 122/72   09/28/17 114/74   05/04/17 119/82    Weight from Last 3 Encounters:   06/29/18 172 lb (78 kg)   09/28/17 192 lb (87.1 kg)   05/04/17 201 lb (91.2 kg)              We Performed the Following     Basic metabolic panel  (Ca, Cl, CO2, Creat, Gluc, K, Na, BUN)     Lipid panel reflex to direct LDL Fasting          Where to get your medicines      These medications were sent to [id number] - [place], [place] - [address]  AT [address]  [address] [place] [address]     Phone:  [phone]     lisinopril-hydrochlorothiazide 20-12.5 MG per tablet    simvastatin 10 MG tablet          Primary Care Provider Fax #    Physician No Ref-Primary [phone]       No address on file        Equal Access to Services     DEV AGARWAL : Hadii ariel ku hadasho Soomaali, waaxda luqadaha, qaybta kaalmada adeegyalake, beronica zimmerman . So [facility] [phone].    ATENCIÓN: Si habla español, tiene a singh disposición servicios gratuitos de asistencia lingüística. Llame al [phone].    We comply with applicable federal civil rights laws and [place] laws. We do not discriminate on the basis of race, color, national origin, age, disability, sex, sexual orientation, or gender identity.            Thank you!     Thank you for choosing [facility] [place]  for your care. Our goal is always to provide you with excellent care. Hearing back from our patients is one way we can continue to improve our services. Please take a few minutes to complete the written survey that you may receive in the mail after your visit with us. Thank you!             Your Updated Medication List - Protect others around you: Learn how to safely use, store and throw away your medicines at www.disposemymeds.org.          This list is accurate as of 6/29/18  9:42 AM.  Always use  your most recent med list.                   Brand Name Dispense Instructions for use Diagnosis    lisinopril-hydrochlorothiazide 20-12.5 MG per tablet    PRINZIDE/ZESTORETIC    90 tablet    Take 1 tablet by mouth daily    Hypertension goal BP (blood pressure) < 140/90       simvastatin 10 MG tablet    ZOCOR    90 tablet    Take 1 tablet by mouth. at bedtime.    Hyperlipidemia LDL goal <130

## 2019-10-01 ENCOUNTER — HEALTH MAINTENANCE LETTER (OUTPATIENT)
Age: 64
End: 2019-10-01

## 2019-12-15 ENCOUNTER — HEALTH MAINTENANCE LETTER (OUTPATIENT)
Age: 64
End: 2019-12-15

## 2021-01-15 ENCOUNTER — HEALTH MAINTENANCE LETTER (OUTPATIENT)
Age: 66
End: 2021-01-15

## 2021-09-04 ENCOUNTER — HEALTH MAINTENANCE LETTER (OUTPATIENT)
Age: 66
End: 2021-09-04

## 2021-10-30 ENCOUNTER — HEALTH MAINTENANCE LETTER (OUTPATIENT)
Age: 66
End: 2021-10-30

## 2022-02-19 ENCOUNTER — HEALTH MAINTENANCE LETTER (OUTPATIENT)
Age: 67
End: 2022-02-19

## 2022-10-16 ENCOUNTER — HEALTH MAINTENANCE LETTER (OUTPATIENT)
Age: 67
End: 2022-10-16

## 2023-04-01 ENCOUNTER — HEALTH MAINTENANCE LETTER (OUTPATIENT)
Age: 68
End: 2023-04-01

## 2023-11-04 ENCOUNTER — HEALTH MAINTENANCE LETTER (OUTPATIENT)
Age: 68
End: 2023-11-04